# Patient Record
Sex: FEMALE | Race: WHITE | Employment: FULL TIME | URBAN - NONMETROPOLITAN AREA
[De-identification: names, ages, dates, MRNs, and addresses within clinical notes are randomized per-mention and may not be internally consistent; named-entity substitution may affect disease eponyms.]

---

## 2022-10-05 ENCOUNTER — HOSPITAL ENCOUNTER (EMERGENCY)
Age: 32
Discharge: HOME | End: 2022-10-05
Payer: MEDICAID

## 2022-10-05 VITALS
DIASTOLIC BLOOD PRESSURE: 88 MMHG | WEIGHT: 184 LBS | RESPIRATION RATE: 16 BRPM | HEART RATE: 82 BPM | TEMPERATURE: 98.4 F | SYSTOLIC BLOOD PRESSURE: 146 MMHG | OXYGEN SATURATION: 100 %

## 2022-10-05 DIAGNOSIS — R19.7 DIARRHEA, UNSPECIFIED TYPE: Primary | ICD-10-CM

## 2022-10-05 DIAGNOSIS — R11.0 NAUSEA: ICD-10-CM

## 2022-10-05 PROCEDURE — 99282 EMERGENCY DEPT VISIT SF MDM: CPT

## 2022-10-05 RX ORDER — ONDANSETRON 4 MG/1
4 TABLET, ORALLY DISINTEGRATING ORAL ONCE
Status: DISCONTINUED | OUTPATIENT
Start: 2022-10-05 | End: 2022-10-05

## 2022-10-05 ASSESSMENT — ENCOUNTER SYMPTOMS
VOMITING: 0
DIARRHEA: 1
NAUSEA: 1

## 2022-10-05 NOTE — ED PROVIDER NOTES
[facility] EMERGENCY DEPT  eMERGENCY dEPARTMENT eNCOUnter      Pt Name: Pam Garza  MRN: [id number]  [place] 1990  Date of evaluation: 10/5/2022  Provider: Afua Brady, [address]       Chief Complaint   Patient presents with    Nausea         HISTORY OF PRESENT ILLNESS   (Location/Symptom, Timing/Onset,Context/Setting, Quality, Duration, Modifying Factors, Severity)  Note limiting factors. Pam Garza is a 28 y.o. female who presents to the emergency department from work after an episode of diarrhea. She also had a rash to her chest and nausea. This has all resolved after her long wait. (4hrs) Denies pregnancy. Says she doesn't always eat right. Thinks it may be something she ate    The history is provided by the patient. NursingNotes were reviewed. REVIEW OF SYSTEMS    (2-9 systems for level 4, 10 or more for level 5)     Review of Systems   Gastrointestinal:  Positive for diarrhea and nausea. Negative for vomiting. Genitourinary:  Negative for dysuria. Skin:  Positive for rash. Except as noted above the remainder of the review of systems was reviewed and negative. PAST MEDICAL HISTORY   No past medical history on file. SURGICALHISTORY     No past surgical history on file. CURRENT MEDICATIONS       There are no discharge medications for this patient. ALLERGIES     Asa [aspirin], Pcn [penicillins], Peanut butter flavor, and Shrimp (diagnostic)    FAMILY HISTORY     No family history on file.        SOCIAL HISTORY       Social History     Socioeconomic History    Marital status: Single       SCREENINGS    Andry Coma Scale  Eye Opening: Spontaneous  Best Verbal Response: Oriented  Best Motor Response: Obeys commands  Hawkins Coma Scale Score: 15 @FLOW([id number])@      PHYSICAL EXAM    (up to 7 for level 4, 8 or more for level 5)     ED Triage Vitals [10/05/22 1529]   BP Temp Temp src Heart Rate Resp SpO2 Height Weight   (!) 152/90 98.4 °F (36.9 °C) -- 82 17 100 % -- 184 lb (83.5 kg)       Physical Exam  Vitals and nursing note reviewed. Constitutional:       Appearance: Normal appearance. She is well-developed. She is obese. HENT:      Head: Normocephalic and atraumatic. Eyes:      General: No scleral icterus. Right eye: No discharge. Left eye: No discharge. Cardiovascular:      Rate and Rhythm: Normal rate and regular rhythm. Heart sounds: Normal heart sounds. Pulmonary:      Effort: Pulmonary effort is normal. No respiratory distress. Breath sounds: Normal breath sounds. Abdominal:      General: Abdomen is protuberant. Bowel sounds are normal.      Palpations: Abdomen is soft. Tenderness: There is no abdominal tenderness. Musculoskeletal:      Cervical back: Normal range of motion and neck supple. Skin:     Findings: No rash. Neurological:      Mental Status: She is alert and oriented to person, place, and time. Psychiatric:         Behavior: Behavior normal.       DIAGNOSTIC RESULTS     EKG: All EKG's are interpreted by the Emergency Department Physician who either signs or Co-signsthis chart in the absence of a cardiologist.        RADIOLOGY:   Non-plain filmimages such as CT, Ultrasound and MRI are read by the radiologist. Plain radiographic images are visualized and preliminarily interpreted by the emergency physician with the below findings:      Interpretation per the Radiologist below, if available at the time of this note:    No orders to display         ED BEDSIDEULTRASOUND:   Performed by ED Physician -none    LABS:  Labs Reviewed - No data to display      All other labs were within normal range or not returned as of this dictation.     EMERGENCY DEPARTMENT COURSE and DIFFERENTIALDIAGNOSIS/MDM:   Vitals:    Vitals:    10/05/22 1529 10/05/22 1929   BP: (!) 152/90 (!) 146/88   Pulse: 82    Resp: 17 16   Temp: 98.4 °F (36.9 °C)    SpO2: 100% 100%   Weight: 184 lb (83.5 kg)            MDM  Counseled to return to work tomorrow if she is not running a fever or having any diarrhea or vomiting. Patient has no concerns for COVID. She has had it previously. Has not had any more diarrhea and nausea is gone      CONSULTS:  None    PROCEDURES:  Unless otherwise noted below, none     Procedures    FINAL IMPRESSION      1. Diarrhea, unspecified type    2. Nausea        DISPOSITION/PLAN   DISPOSITION Decision To Discharge 10/05/2022 07:08:24 PM      PATIENT REFERRED TO:  No follow-up provider specified. DISCHARGE MEDICATIONS:  There are no discharge medications for this patient.          (Please note that portions of this note were completed with a voice recognitionprogram.  Efforts were made to edit the dictations but occasionally words are mis-transcribed.)    JOAQUÍN Laboy (electronically signed)          JOAQUÍN Laboy  10/06/22 [id number]

## 2023-02-15 ENCOUNTER — APPOINTMENT (OUTPATIENT)
Dept: CT IMAGING | Age: 33
End: 2023-02-15
Payer: MEDICAID

## 2023-02-15 ENCOUNTER — HOSPITAL ENCOUNTER (EMERGENCY)
Age: 33
Discharge: HOME | End: 2023-02-15
Payer: MEDICAID

## 2023-02-15 VITALS
WEIGHT: 180 LBS | HEIGHT: 64 IN | DIASTOLIC BLOOD PRESSURE: 84 MMHG | OXYGEN SATURATION: 99 % | TEMPERATURE: 98.4 F | RESPIRATION RATE: 16 BRPM | BODY MASS INDEX: 30.73 KG/M2 | SYSTOLIC BLOOD PRESSURE: 148 MMHG | HEART RATE: 84 BPM

## 2023-02-15 DIAGNOSIS — N83.202 CYST OF LEFT OVARY: ICD-10-CM

## 2023-02-15 DIAGNOSIS — R10.13 ABDOMINAL PAIN, EPIGASTRIC: Primary | ICD-10-CM

## 2023-02-15 LAB
ALBUMIN SERPL-MCNC: 5.1 G/DL (ref 3.5–5.2)
ALP BLD-CCNC: 71 U/L (ref 35–104)
ALT SERPL-CCNC: 20 U/L (ref 5–33)
ANION GAP SERPL CALCULATED.3IONS-SCNC: 11 MMOL/L (ref 7–19)
APTT: 29.5 SEC (ref 26–36.2)
AST SERPL-CCNC: 21 U/L (ref 5–32)
BACTERIA: ABNORMAL /HPF
BASOPHILS ABSOLUTE: 0.1 K/UL (ref 0–0.2)
BASOPHILS RELATIVE PERCENT: 0.6 % (ref 0–1)
BILIRUB SERPL-MCNC: 0.3 MG/DL (ref 0.2–1.2)
BILIRUBIN URINE: NEGATIVE
BLOOD, URINE: NEGATIVE
BUN BLDV-MCNC: 14 MG/DL (ref 6–20)
CALCIUM SERPL-MCNC: 9.4 MG/DL (ref 8.6–10)
CHLORIDE BLD-SCNC: 102 MMOL/L (ref 98–111)
CLARITY: CLEAR
CO2: 26 MMOL/L (ref 22–29)
COLOR: YELLOW
CREAT SERPL-MCNC: 0.7 MG/DL (ref 0.5–0.9)
CRYSTALS, UA: ABNORMAL /HPF
EOSINOPHILS ABSOLUTE: 0.2 K/UL (ref 0–0.6)
EOSINOPHILS RELATIVE PERCENT: 1.8 % (ref 0–5)
EPITHELIAL CELLS, UA: 4 /HPF (ref 0–5)
GFR SERPL CREATININE-BSD FRML MDRD: >60 ML/MIN/{1.73_M2}
GLUCOSE BLD-MCNC: 82 MG/DL (ref 74–109)
GLUCOSE URINE: NEGATIVE MG/DL
HCG QUALITATIVE: NEGATIVE
HCT VFR BLD CALC: 39.8 % (ref 37–47)
HEMOGLOBIN: 13.5 G/DL (ref 12–16)
HYALINE CASTS: 2 /HPF (ref 0–8)
IMMATURE GRANULOCYTES #: 0 K/UL
INR BLD: 0.94 (ref 0.88–1.18)
KETONES, URINE: NEGATIVE MG/DL
LEUKOCYTE ESTERASE, URINE: ABNORMAL
LIPASE: 26 U/L (ref 13–60)
LYMPHOCYTES ABSOLUTE: 2.1 K/UL (ref 1.1–4.5)
LYMPHOCYTES RELATIVE PERCENT: 23.4 % (ref 20–40)
MCH RBC QN AUTO: 30 PG (ref 27–31)
MCHC RBC AUTO-ENTMCNC: 33.9 G/DL (ref 33–37)
MCV RBC AUTO: 88.4 FL (ref 81–99)
MONOCYTES ABSOLUTE: 0.6 K/UL (ref 0–0.9)
MONOCYTES RELATIVE PERCENT: 6.2 % (ref 0–10)
NEUTROPHILS ABSOLUTE: 6.1 K/UL (ref 1.5–7.5)
NEUTROPHILS RELATIVE PERCENT: 67.6 % (ref 50–65)
NITRITE, URINE: NEGATIVE
PDW BLD-RTO: 12.1 % (ref 11.5–14.5)
PH UA: 7 (ref 5–8)
PLATELET # BLD: 236 K/UL (ref 130–400)
PMV BLD AUTO: 10.3 FL (ref 9.4–12.3)
POTASSIUM REFLEX MAGNESIUM: 4 MMOL/L (ref 3.5–5)
PROTEIN UA: ABNORMAL MG/DL
PROTHROMBIN TIME: 12.5 SEC (ref 12–14.6)
RBC # BLD: 4.5 M/UL (ref 4.2–5.4)
RBC UA: 2 /HPF (ref 0–4)
SODIUM BLD-SCNC: 139 MMOL/L (ref 136–145)
SPECIFIC GRAVITY UA: 1.02 (ref 1–1.03)
TOTAL PROTEIN: 7.5 G/DL (ref 6.6–8.7)
UROBILINOGEN, URINE: 0.2 E.U./DL
WBC # BLD: 9 K/UL (ref 4.8–10.8)
WBC UA: 5 /HPF (ref 0–5)

## 2023-02-15 PROCEDURE — 85730 THROMBOPLASTIN TIME PARTIAL: CPT

## 2023-02-15 PROCEDURE — 85610 PROTHROMBIN TIME: CPT

## 2023-02-15 PROCEDURE — 6360000002 HC RX W HCPCS: Performed by: PHYSICIAN ASSISTANT

## 2023-02-15 PROCEDURE — 84703 CHORIONIC GONADOTROPIN ASSAY: CPT

## 2023-02-15 PROCEDURE — 83690 ASSAY OF LIPASE: CPT

## 2023-02-15 PROCEDURE — 36415 COLL VENOUS BLD VENIPUNCTURE: CPT

## 2023-02-15 PROCEDURE — 99285 EMERGENCY DEPT VISIT HI MDM: CPT

## 2023-02-15 PROCEDURE — 6360000004 HC RX CONTRAST MEDICATION: Performed by: PHYSICIAN ASSISTANT

## 2023-02-15 PROCEDURE — 85025 COMPLETE CBC W/AUTO DIFF WBC: CPT

## 2023-02-15 PROCEDURE — 81001 URINALYSIS AUTO W/SCOPE: CPT

## 2023-02-15 PROCEDURE — 96374 THER/PROPH/DIAG INJ IV PUSH: CPT

## 2023-02-15 PROCEDURE — 74177 CT ABD & PELVIS W/CONTRAST: CPT

## 2023-02-15 PROCEDURE — 80053 COMPREHEN METABOLIC PANEL: CPT

## 2023-02-15 RX ORDER — LOSARTAN POTASSIUM 50 MG/1
50 TABLET ORAL DAILY
COMMUNITY

## 2023-02-15 RX ORDER — OXYMETAZOLINE HYDROCHLORIDE 0.05 G/100ML
2 SPRAY NASAL ONCE
Status: DISCONTINUED | OUTPATIENT
Start: 2023-02-15 | End: 2023-02-15 | Stop reason: HOSPADM

## 2023-02-15 RX ORDER — METOCLOPRAMIDE HYDROCHLORIDE 5 MG/ML
10 INJECTION INTRAMUSCULAR; INTRAVENOUS ONCE
Status: COMPLETED | OUTPATIENT
Start: 2023-02-15 | End: 2023-02-15

## 2023-02-15 RX ORDER — HYDROCHLOROTHIAZIDE 12.5 MG/1
12.5 TABLET ORAL DAILY
COMMUNITY

## 2023-02-15 RX ADMIN — METOCLOPRAMIDE 10 MG: 5 INJECTION, SOLUTION INTRAMUSCULAR; INTRAVENOUS at 17:53

## 2023-02-15 RX ADMIN — IOPAMIDOL 70 ML: 755 INJECTION, SOLUTION INTRAVENOUS at 17:18

## 2023-02-15 ASSESSMENT — ENCOUNTER SYMPTOMS
ABDOMINAL PAIN: 1
APNEA: 0
EYE PAIN: 0
EYE DISCHARGE: 0
COUGH: 0
DIARRHEA: 1
SHORTNESS OF BREATH: 0
PHOTOPHOBIA: 0
COLOR CHANGE: 0
RHINORRHEA: 0
SORE THROAT: 0
ABDOMINAL DISTENTION: 0
NAUSEA: 0
BACK PAIN: 0

## 2023-02-15 ASSESSMENT — PAIN SCALES - GENERAL: PAINLEVEL_OUTOF10: 7

## 2023-02-15 ASSESSMENT — PAIN DESCRIPTION - DESCRIPTORS: DESCRIPTORS: STABBING

## 2023-02-15 ASSESSMENT — PAIN - FUNCTIONAL ASSESSMENT: PAIN_FUNCTIONAL_ASSESSMENT: 0-10

## 2023-02-15 ASSESSMENT — PAIN DESCRIPTION - LOCATION: LOCATION: ABDOMEN

## 2023-02-15 NOTE — LETTER
[facility] EMERGENCY DEPT  [address] [address]  Phone: [phone]               February 15, 2023    Patient: Michael Carbajal   YOB: 1990   Date of Visit: 2/15/2023       To Whom It May Concern:    Michael Carbajal was seen and treated in our emergency department on 2/15/2023. She may return on 2/16/2023.     Sincerely,       Candie Abbott RN         Signature:__________________________________

## 2023-02-16 NOTE — ED PROVIDER NOTES
[facility] EMERGENCY DEPT  eMERGENCYdEPARTMENT eNCOUnter      Pt Name: Ajay Robrets  MRN: [id number]  [place] 1990  Date of evaluation: 2/15/2023  Provider:RICHA Demarco    CHIEF COMPLAINT       Chief Complaint   Patient presents with    Epistaxis     Pt arrived to the ed with c/o nose bleed. Onset 1250. Pt also c/o abdominal pain    Abdominal Pain         HISTORY OF PRESENT ILLNESS  (Location/Symptom, Timing/Onset, Context/Setting, Quality, Duration, Modifying Factors, Severity.)   Ajay Roberts is a 28 y.o. female who presents to the emergency department with complaints of nose bleed. Onset 1250 today no active bleeding here now. States epigastric pain with loose stool. The patient has no prior surgical hx. She denies fever or chills. No recent abx. She endorses eating out twice yesterday could play a role in possible GI issues. No flank referral. She is prone to sinusitis and getting dried out could be playing a role. HPI    Nursing Notes were reviewed and I agree. REVIEW OF SYSTEMS    (2-9 systems for level 4, 10 or more for level 5)     Review of Systems   Constitutional:  Negative for activity change, appetite change, chills and fever. HENT:  Positive for nosebleeds. Negative for congestion, postnasal drip, rhinorrhea and sore throat. Eyes:  Negative for photophobia, pain, discharge and visual disturbance. Respiratory:  Negative for apnea, cough and shortness of breath. Cardiovascular:  Negative for chest pain and leg swelling. Gastrointestinal:  Positive for abdominal pain and diarrhea. Negative for abdominal distention and nausea. Genitourinary:  Negative for vaginal bleeding. Musculoskeletal:  Negative for arthralgias, back pain, joint swelling, neck pain and neck stiffness. Skin:  Negative for color change and rash. Neurological:  Negative for dizziness, syncope, facial asymmetry and headaches. Hematological:  Negative for adenopathy. Does not bruise/bleed easily. Psychiatric/Behavioral:  Negative for agitation, behavioral problems and confusion. Except as noted above the remainder of the review of systems was reviewed and negative. PAST MEDICAL HISTORY     Past Medical History:   Diagnosis Date    Hypertension          SURGICAL HISTORY       Past Surgical History:   Procedure Laterality Date    ENDOMETRIAL ABLATION           CURRENT MEDICATIONS       Discharge Medication List as of 2/15/2023  5:48 PM        CONTINUE these medications which have NOT CHANGED    Details   losartan (COZAAR) 50 MG tablet Take 50 mg by mouth dailyHistorical Med      hydroCHLOROthiazide (HYDRODIURIL) 12.5 MG tablet Take 12.5 mg by mouth dailyHistorical Med             ALLERGIES     Asa [aspirin], Pcn [penicillins], Peanut butter flavor, and Shrimp (diagnostic)    FAMILY HISTORY     History reviewed. No pertinent family history. SOCIAL HISTORY       Social History     Socioeconomic History    Marital status: Single     Spouse name: None    Number of children: None    Years of education: None    Highest education level: None   Tobacco Use    Smoking status: Never    Smokeless tobacco: Never   Vaping Use    Vaping Use: Never used   Substance and Sexual Activity    Alcohol use: Not Currently    Drug use: Not Currently       SCREENINGS    Andry Coma Scale  Eye Opening: Spontaneous  Best Verbal Response: Oriented  Best Motor Response: Obeys commands  Andry Coma Scale Score: 15      PHYSICAL EXAM    (up to 7 forlevel 4, 8 or more for level 5)     ED Triage Vitals   BP Temp Temp Source Heart Rate Resp SpO2 Height Weight   02/15/23 1311 02/15/23 1311 02/15/23 1311 02/15/23 1311 02/15/23 1311 02/15/23 1311 02/15/23 1307 02/15/23 1307   (!) 151/98 98.2 °F (36.8 °C) Oral 86 17 99 % 5' 4\" (1.626 m) 180 lb (81.6 kg)       Physical Exam  Vitals and nursing note reviewed. Constitutional:       General: She is not in acute distress. Appearance: She is well-developed.  She is not diaphoretic. HENT:      Head: Normocephalic and atraumatic. Right Ear: External ear normal.      Left Ear: External ear normal.      Mouth/Throat:      Mouth: Mucous membranes are moist.      Pharynx: Oropharynx is clear. No pharyngeal swelling or oropharyngeal exudate. Eyes:      General:         Right eye: No discharge. Left eye: No discharge. Extraocular Movements: Extraocular movements intact. Pupils: Pupils are equal, round, and reactive to light. Neck:      Thyroid: No thyromegaly. Cardiovascular:      Rate and Rhythm: Normal rate and regular rhythm. Heart sounds: Normal heart sounds. No murmur heard. No friction rub. Pulmonary:      Effort: Pulmonary effort is normal. No respiratory distress. Breath sounds: Normal breath sounds. No stridor. No wheezing. Abdominal:      General: Bowel sounds are normal. There is no distension. Palpations: Abdomen is soft. Tenderness: There is abdominal tenderness in the epigastric area. Musculoskeletal:         General: Normal range of motion. Cervical back: Normal range of motion and neck supple. Skin:     General: Skin is warm and dry. Capillary Refill: Capillary refill takes less than 2 seconds. Findings: No rash. Neurological:      Mental Status: She is alert and oriented to person, place, and time. Cranial Nerves: No cranial nerve deficit. Sensory: No sensory deficit. Coordination: Coordination normal.   Psychiatric:         Behavior: Behavior normal.         Thought Content:  Thought content normal.         DIAGNOSTIC RESULTS     RADIOLOGY:   Non-plain film images such as CT, Ultrasound and MRI are read by the radiologist. Plain radiographic images are visualized and preliminarilyinterpreted by No att. providers found with the below findings:        Interpretation per the Radiologist below, if available at the time of this note:    CT ABDOMEN PELVIS W IV CONTRAST Additional Contrast? None   Final Result   Impression:    1.No acute abnormality identified within the abdomen or pelvis.    2.Probable 2 cm LEFT ovarian corpus luteum.    3.Additional findings as detailed above.          LABS:  Labs Reviewed   URINALYSIS WITH REFLEX TO CULTURE - Abnormal; Notable for the following components:       Result Value    Protein, UA TRACE (*)     Leukocyte Esterase, Urine TRACE (*)     All other components within normal limits   CBC WITH AUTO DIFFERENTIAL - Abnormal; Notable for the following components:    Neutrophils % 67.6 (*)     All other components within normal limits   MICROSCOPIC URINALYSIS - Abnormal; Notable for the following components:    Bacteria, UA TRACE (*)     Crystals, UA NEG (*)     All other components within normal limits   HCG, SERUM, QUALITATIVE   LIPASE   COMPREHENSIVE METABOLIC PANEL W/ REFLEX TO MG FOR LOW K   PROTIME-INR   APTT       All other labs were within normal range or notreturned as of this dictation.    RE-ASSESSMENT          EMERGENCY DEPARTMENT COURSE and DIFFERENTIAL DIAGNOSIS/MDM:   Vitals:    Vitals:    02/15/23 1307 02/15/23 1311 02/15/23 1800   BP:  (!) 151/98 (!) 148/84   Pulse:  86 84   Resp:  17 16   Temp:  98.2 °F (36.8 °C) 98.4 °F (36.9 °C)   TempSrc:  Oral    SpO2:  99% 99%   Weight: 180 lb (81.6 kg)     Height: 5' 4\" (1.626 m)           MDM  Number of Diagnoses or Management Options  Abdominal pain, epigastric: new, needed workup  Cyst of left ovary: new, needed workup     Amount and/or Complexity of Data Reviewed  Clinical lab tests: reviewed  Tests in the radiology section of CPT®: reviewed  Tests in the medicine section of CPT®: reviewed  Discuss the patient with other providers: yes    Pain could be referred for gallbladder she will follow up on her HIDA scan with PCP. Made aware of ovarian cyst she will follow with OB she denies any active bleeding has been observed multiple hours an ambulated still hasnt provided stool. Plan for  ART    PROCEDURES:    Procedures      FINAL IMPRESSION      1. Abdominal pain, epigastric    2. Cyst of left ovary          DISPOSITION/PLAN   DISPOSITION Decision To Discharge 02/15/2023 05:44:52 PM      PATIENT REFERRED TO:  [facility] EMERGENCY DEPT  Sundar Milton  [phone]    If symptoms worsen    Tram Damon  [phone]    outpatient hida scan      DISCHARGE MEDICATIONS:  Discharge Medication List as of 2/15/2023  5:48 PM          (Please note that portions of this note were completed with a voice recognition program.  Efforts were made to edit the dictations but occasionallywords are mis-transcribed.)    Joaquina Helms, [address] [place] [address], [address] [place] [address]  02/15/23 0037

## 2023-03-27 ENCOUNTER — HOSPITAL ENCOUNTER (OUTPATIENT)
Dept: GENERAL RADIOLOGY | Age: 33
Discharge: HOME | End: 2023-03-27
Payer: COMMERCIAL

## 2023-03-27 DIAGNOSIS — S39.012D ACUTE MYOFASCIAL STRAIN OF LUMBAR REGION, SUBSEQUENT ENCOUNTER: ICD-10-CM

## 2023-03-27 DIAGNOSIS — M54.50 LOW BACK PAIN, UNSPECIFIED BACK PAIN LATERALITY, UNSPECIFIED CHRONICITY, UNSPECIFIED WHETHER SCIATICA PRESENT: ICD-10-CM

## 2023-03-27 PROCEDURE — 72100 X-RAY EXAM L-S SPINE 2/3 VWS: CPT

## 2025-04-28 ENCOUNTER — HOSPITAL ENCOUNTER (OUTPATIENT)
Dept: PHYSICAL THERAPY | Age: 35
Setting detail: THERAPIES SERIES
Discharge: HOME | End: 2025-04-28
Payer: COMMERCIAL

## 2025-04-28 VITALS — DIASTOLIC BLOOD PRESSURE: 109 MMHG | SYSTOLIC BLOOD PRESSURE: 153 MMHG | HEART RATE: 84 BPM | OXYGEN SATURATION: 100 %

## 2025-04-28 PROCEDURE — 97162 PT EVAL MOD COMPLEX 30 MIN: CPT

## 2025-04-28 PROCEDURE — 97110 THERAPEUTIC EXERCISES: CPT

## 2025-04-28 ASSESSMENT — PAIN DESCRIPTION - PAIN TYPE: TYPE: CHRONIC PAIN

## 2025-04-28 ASSESSMENT — PAIN DESCRIPTION - LOCATION: LOCATION: BACK

## 2025-04-28 ASSESSMENT — PAIN DESCRIPTION - ORIENTATION: ORIENTATION: POSTERIOR

## 2025-04-28 ASSESSMENT — PAIN DESCRIPTION - DESCRIPTORS: DESCRIPTORS: ACHING;SORE

## 2025-04-28 ASSESSMENT — PAIN SCALES - GENERAL: PAINLEVEL_OUTOF10: 5

## 2025-04-28 NOTE — PROGRESS NOTES
Physical Therapy: Initial Evaluation    Patient: Naima Madison (34 y.o. female)   Examination Date: 2025  Plan of Care Certification Period: 2025 to 25      :  1990 ;    Confirmed: Yes MRN: [id number]  CSN: [id number]   Insurance: Payor: BCBS / Plan: BCBS OUT OF STATE / Product Type: *No Product type* /   Insurance ID: [id number] - ([facility]) Secondary Insurance (if applicable):    Referring Physician: Celina Mendez APRN Jessica Awbery   PCP: Cristy Kruse APRN - NP Visits to Date/Visits Approved:     No Show/Cancelled Appts:   /       Medical Diagnosis: Low back pain, unspecified [M54.50] Low back pain  Treatment Diagnosis: Low back pain     PERTINENT MEDICAL HISTORY      Self reported health status:: Fair    Medical History:     Past Medical History:   Diagnosis Date    Hypertension      Surgical History:   Past Surgical History:   Procedure Laterality Date    ENDOMETRIAL ABLATION         Medications:   Current Outpatient Medications:     losartan (COZAAR) 50 MG tablet, Take 50 mg by mouth daily, Disp: , Rfl:     hydroCHLOROthiazide (HYDRODIURIL) 12.5 MG tablet, Take 12.5 mg by mouth daily, Disp: , Rfl:   Allergies: Asa [aspirin], Pcn [penicillins], Peanut butter flavoring agent (non-screening), and Shrimp (diagnostic)      SUBJECTIVE EXAMINATION      ,           Subjective History:    Subjective: She rates pain at 5/10.  She has constant pain.  She has lower and mid back pain.  She has most pain with prolonged standing.  She denies LE numbness, tingling, or weakness.  She has trouble sleeping at times due to pain.  She is most comfortable lying down.  She performs light home chores.  Additional Pertinent Hx (if applicable): 34 year old female has been referred for PT evaluation and treatment of back pain.  She has had back pain since 2016.  She was treated with \"some\" therapy.  She had residual back pain.  While working at [employer] 2 years ago she experienced increased

## 2025-05-02 ENCOUNTER — HOSPITAL ENCOUNTER (OUTPATIENT)
Dept: PHYSICAL THERAPY | Age: 35
Setting detail: THERAPIES SERIES
Discharge: HOME | End: 2025-05-02
Payer: COMMERCIAL

## 2025-05-02 PROCEDURE — G0283 ELEC STIM OTHER THAN WOUND: HCPCS

## 2025-05-02 PROCEDURE — 97110 THERAPEUTIC EXERCISES: CPT

## 2025-05-02 NOTE — PROGRESS NOTES
Physical Therapy: Daily Note   Patient: Naima Madison (34 y.o. female)   Examination Date: 2025  Plan of Care/Certification Expiration Date: 25    No data recorded   :  1990 # of Visits since SOC:   2   MRN: [id number]  CSN: [id number] Start of Care Date:   2025   Insurance: Payor: [facility] / Plan: BCBS OUT OF STATE / Product Type: *No Product type* /   Insurance ID: [id number] - ([facility]) Secondary Insurance (if applicable):    Referring Physician: Celina Mendez APRN Jessica Awbery   PCP: Cristy Kruse APRN - NP Visits to Date/Visits Approved:     No Show/Cancelled Appts:   /       Medical Diagnosis: Low back pain, unspecified [M54.50]    Treatment Diagnosis: Low back pain        SUBJECTIVE EXAMINATION   Pain Level: Pain Screening  Patient Currently in Pain: Denies    Patient Comments: Subjective: I am about the same.        TREATMENT     Exercises:      Treatment Reasoning    Exercise 1: 6 ' with 3 brief seated rest breaks  Exercise 2: Supine, TA series alone 5 x 10 sec, all others 10 reps- moderate cues for all  Exercise 3: Supine, lower trunk rotation, 10 reps  Exercise 4: Supine, bridges, 10 reps  Exercise 5: Supine, check leg length, treat accordingly,-leg length even 2025  Exercise 6: Supine, 1 MARSHALL x 10 reps-with towel, DKTC X 5 with towel  Exercise 7: Supine, pelvic tilt, 10 reps  Exercise 8: Supine, partial crunch, 10 reps  Exercise 9: Sitting, LAQ, 3#, 10 reps  Exercise 10: Sitting, sit to stand, mat to chair progression, 10 reps  Exercise 11: Standing, multifidus row, Paloff press, shoulder retraction, 10 reps-NOT TODAY  Exercise 12: Standing, hip abd, ext, mini squat, heel raise 1 x 10  Exercise 13: Standing, press down on top of physio ball for abd contraction, 10 reps-NOT TODAY  Exercise 14: LBE, 5 min-NOT TODAY  Exercise 15: IFC wth MH as needed X 20 mins-seated  Exercise 16: ILT, 1/2 body wt, clinic protocols, as indicated.

## 2025-05-05 ENCOUNTER — HOSPITAL ENCOUNTER (OUTPATIENT)
Dept: PHYSICAL THERAPY | Age: 35
Setting detail: THERAPIES SERIES
Discharge: HOME | End: 2025-05-05
Payer: COMMERCIAL

## 2025-05-05 PROCEDURE — G0283 ELEC STIM OTHER THAN WOUND: HCPCS

## 2025-05-05 PROCEDURE — 97110 THERAPEUTIC EXERCISES: CPT

## 2025-05-05 ASSESSMENT — PAIN SCALES - GENERAL: PAINLEVEL_OUTOF10: 3

## 2025-05-05 ASSESSMENT — PAIN DESCRIPTION - ORIENTATION: ORIENTATION: LOWER

## 2025-05-05 ASSESSMENT — PAIN DESCRIPTION - LOCATION: LOCATION: BACK

## 2025-05-05 ASSESSMENT — PAIN DESCRIPTION - DESCRIPTORS: DESCRIPTORS: ACHING;SORE

## 2025-05-05 ASSESSMENT — PAIN DESCRIPTION - PAIN TYPE: TYPE: CHRONIC PAIN

## 2025-05-05 NOTE — PROGRESS NOTES
Physical Therapy: Daily Note   Patient: Naima Madison (34 y.o. female)   Examination Date: 2025  Plan of Care/Certification Expiration Date: 25    No data recorded   :  1990 # of Visits since SOC:   3   MRN: [id number]  CSN: [id number] Start of Care Date:   2025   Insurance: Payor: BC / Plan: BCBS OUT OF STATE / Product Type: *No Product type* /   Insurance ID: [id number] - ([facility]) Secondary Insurance (if applicable):    Referring Physician: Celina Mendez APRN Jessica Awbery   PCP: Cristy Kruse APRN - NP Visits to Date/Visits Approved: 3 / 12    No Show/Cancelled Appts:   /       Medical Diagnosis: Low back pain, unspecified [M54.50]    Treatment Diagnosis: Low back pain        SUBJECTIVE EXAMINATION   Pain Level: Pain Screening  Patient Currently in Pain: Yes  Pain Assessment: 0-10  Pain Level: 3  Pain Type: Chronic pain  Pain Location: Back  Pain Orientation: Lower  Pain Descriptors: Aching, Sore    Patient Comments: Subjective: Doing okay. I was fine after last session        TREATMENT     Exercises:      Treatment Reasoning    Exercise 1: 6 ' with 3 brief seated rest breaks  Exercise 2: Supine, TA series alone 10 x 10 sec, all others 10 reps- minimal cues  Exercise 3: Supine, lower trunk rotation, 10 reps  Exercise 4: Supine, bridges, 10 reps  Exercise 5: Supine, check leg length, treat accordingly,-leg length even 2025  Exercise 6: Supine, 1 MARSHALL x 10 reps-with towel, DKTC X 10 with towel  Exercise 7: Supine, pelvic tilt, 10 reps  Exercise 8: Supine, partial crunch, 10 reps  Exercise 9: Sitting, LAQ, 3#, 10 reps  Exercise 10: Sitting, sit to stand, mat to chair progression, 10 reps-from mat  Exercise 11: Standing, multifidus row-not today, Paloff press-not today, shoulder retraction-red x 10 reps  Exercise 12: Standing, hip abd, ext, mini squat, heel raise 1 x 10  Exercise 13: Standing, press down on top of physio ball for abd contraction, 10 reps  Exercise 14: LBE, 5

## 2025-05-08 ENCOUNTER — APPOINTMENT (OUTPATIENT)
Dept: PHYSICAL THERAPY | Age: 35
End: 2025-05-08
Payer: COMMERCIAL

## 2025-05-09 ENCOUNTER — HOSPITAL ENCOUNTER (OUTPATIENT)
Dept: PHYSICAL THERAPY | Age: 35
Setting detail: THERAPIES SERIES
Discharge: HOME | End: 2025-05-09
Payer: COMMERCIAL

## 2025-05-09 PROCEDURE — 97032 APPL MODALITY 1+ESTIM EA 15: CPT

## 2025-05-09 PROCEDURE — 97110 THERAPEUTIC EXERCISES: CPT

## 2025-05-09 ASSESSMENT — PAIN DESCRIPTION - LOCATION: LOCATION: BACK

## 2025-05-09 ASSESSMENT — PAIN DESCRIPTION - ORIENTATION: ORIENTATION: LOWER

## 2025-05-09 ASSESSMENT — PAIN SCALES - GENERAL: PAINLEVEL_OUTOF10: 4

## 2025-05-09 ASSESSMENT — PAIN DESCRIPTION - DESCRIPTORS: DESCRIPTORS: ACHING

## 2025-05-09 ASSESSMENT — PAIN DESCRIPTION - PAIN TYPE: TYPE: CHRONIC PAIN

## 2025-05-09 NOTE — PROGRESS NOTES
Physical Therapy: Daily Note   Patient: Naima Madison (34 y.o. female)   Examination Date: 2025  Plan of Care/Certification Expiration Date: 25    No data recorded   :  1990 # of Visits since SOC:   4   MRN: [id number]  CSN: [id number] Start of Care Date:   2025   Insurance: Payor: [facility] / Plan: BCBS OUT OF STATE / Product Type: *No Product type* /   Insurance ID: [id number] - ([facility]) Secondary Insurance (if applicable):    Referring Physician: Celina Mendez APRN Jessica Awbery   PCP: Cristy Kruse APRN - NP Visits to Date/Visits Approved:     No Show/Cancelled Appts:   /       Medical Diagnosis: Low back pain, unspecified [M54.50]    Treatment Diagnosis: Low back pain        SUBJECTIVE EXAMINATION   Pain Level: Pain Screening  Patient Currently in Pain: Yes  Pain Assessment: 0-10  Pain Level: 4  Pain Type: Chronic pain  Pain Location: Back  Pain Orientation: Lower  Pain Descriptors: Aching    Patient Comments: Subjective: I had an episode where I got a severe headache and starting drifting off the road.  I went straight home and rested.  I feel better today.      TREATMENT     Exercises:      Treatment Reasoning    Exercise 1: 6 ' with 3 brief seated rest breaks   -NOT TODAY      CHECK VITALS PRE AND POST SESSION ..  BP  153/109      99 spo2, 81 HR  Exercise 2: Supine, TA series alone 5  x 10 sec, all others 10 reps- minimal cues  Exercise 3: Supine, lower trunk rotation, 10 reps  Exercise 4: Supine, bridges, 10 reps                     /107 , 99 SPO2, HR 84  Exercise 5: Supine, check leg length, treat accordingly,-leg length even 2025  Exercise 6: Supine, 1 MARSHALL x 10 reps-with towel, DKTC X 0  Exercise 7: Supine, pelvic tilt, 10 reps  Exercise 8: Supine, partial crunch, 10 reps-NOT TODAY  Exercise 9: Sitting, LAQ, 3#, 10 reps-NO WEIGHT BUT INCREASED TO 15 REPS ON   Exercise 10: Sitting, sit to stand, mat to chair progression, 10 reps-from mat  Exercise 11:

## 2025-05-12 ENCOUNTER — HOSPITAL ENCOUNTER (OUTPATIENT)
Dept: PHYSICAL THERAPY | Age: 35
Setting detail: THERAPIES SERIES
Discharge: HOME | End: 2025-05-12
Payer: COMMERCIAL

## 2025-05-12 PROCEDURE — G0283 ELEC STIM OTHER THAN WOUND: HCPCS

## 2025-05-12 PROCEDURE — 97110 THERAPEUTIC EXERCISES: CPT

## 2025-05-12 ASSESSMENT — PAIN DESCRIPTION - DESCRIPTORS: DESCRIPTORS: ACHING;SORE

## 2025-05-12 ASSESSMENT — PAIN SCALES - GENERAL: PAINLEVEL_OUTOF10: 5

## 2025-05-12 ASSESSMENT — PAIN DESCRIPTION - ORIENTATION: ORIENTATION: LOWER;LEFT

## 2025-05-12 ASSESSMENT — PAIN DESCRIPTION - PAIN TYPE: TYPE: CHRONIC PAIN

## 2025-05-12 ASSESSMENT — PAIN DESCRIPTION - LOCATION: LOCATION: BACK

## 2025-05-12 NOTE — PROGRESS NOTES
Physical Therapy  Daily Treatment Note  Date: 2025  Patient Name: Naima Madison  MRN: [id number]     :   1990    Subjective:      PT Visit Information  PT Insurance Information: BCBS    (no auth)  Total # of Visits Approved: 12  Total # of Visits to Date: 5  Plan of Care/Certification Expiration Date: 25  Progress Note Due Date: 25  Referring Provider (secondary): Celina Mendez  Subjective: Patient reports that her L low back and groin area has been bothering her pretty good today.    Pain Screening  Patient Currently in Pain: Yes  Pain Assessment: 0-10  Pain Level: 5  Pain Type: Chronic pain  Pain Location: Back  Pain Orientation: Lower;Left  Pain Descriptors: Aching;Sore       Treatment Activities:   Exercises  Exercise 1: 6 ' with 3 brief seated rest breaks-     CHECK VITALS PRE AND POST SESSION ..  BP  134/94      98 spo2, 91 HR  Exercise 2: Supine, TA series alone 5  x 10 sec, all others 10 reps- minimal cues  Exercise 3: Supine, lower trunk rotation, 10 reps  Exercise 4: Supine, bridges, 10 reps  Exercise 5: Supine, check leg length, treat accordingly,-leg length even 2025  Exercise 6: Supine, 1 MARSHALL x 10 reps-with towel, DKTC X 0  Exercise 7: Supine, pelvic tilt, 10 reps  Exercise 8: Supine, partial crunch, 10 reps-NOT TODAY  Exercise 9: Sitting, LAQ, 3#, 10 reps-NO WEIGHT BUT INCREASED TO 15 REPS ON   Exercise 10: Sitting, sit to stand, mat to chair progression, 10 reps-from mat  Exercise 11: Standing, multifidus row-not today, Paloff press-not today, shoulder retraction-red x 10 reps-NOT TODAY  Exercise 12: Standing, hip abd, ext, mini squat, heel raise 1 x 10  Exercise 13: Standing, press down on top of physio ball for abd contraction, 10 reps-NOT TODAY  Exercise 14: LBE, 5 min-NOT TODAY  Exercise 15: IFC wth MH as needed X 20 mins-seated                   /97, spo2 98%, HR 84  Exercise 16: ILT, 1/2 body wt, clinic protocols, as indicated.     Assessment:   Conditions

## 2025-05-15 ENCOUNTER — HOSPITAL ENCOUNTER (OUTPATIENT)
Dept: PHYSICAL THERAPY | Age: 35
Setting detail: THERAPIES SERIES
Discharge: HOME | End: 2025-05-15
Payer: COMMERCIAL

## 2025-05-15 PROCEDURE — G0283 ELEC STIM OTHER THAN WOUND: HCPCS

## 2025-05-15 PROCEDURE — 97110 THERAPEUTIC EXERCISES: CPT

## 2025-05-15 ASSESSMENT — PAIN DESCRIPTION - DESCRIPTORS: DESCRIPTORS: ACHING;SORE

## 2025-05-15 ASSESSMENT — PAIN SCALES - GENERAL: PAINLEVEL_OUTOF10: 4

## 2025-05-15 ASSESSMENT — PAIN DESCRIPTION - PAIN TYPE: TYPE: CHRONIC PAIN

## 2025-05-15 ASSESSMENT — PAIN DESCRIPTION - LOCATION: LOCATION: BACK

## 2025-05-15 ASSESSMENT — PAIN DESCRIPTION - ORIENTATION: ORIENTATION: RIGHT;LEFT;LOWER

## 2025-05-15 NOTE — PROGRESS NOTES
Physical Therapy  Daily Treatment Note  Date: 5/15/2025  Patient Name: Naima Madison  MRN: [id number]     :   1990    Subjective:      PT Visit Information  PT Insurance Information: BCBS    (no auth)  Total # of Visits Approved: 12  Total # of Visits to Date: 6  Plan of Care/Certification Expiration Date: 25  Progress Note Due Date: 25  Referring Provider (secondary): Celina Mendez  Subjective: Patient reports continued low back pain this date that is a little better than last times she was here.    Pain Screening  Patient Currently in Pain: Yes  Pain Assessment: 0-10  Pain Level: 4  Pain Type: Chronic pain  Pain Location: Back  Pain Orientation: Right;Left;Lower  Pain Descriptors: Aching;Sore       Treatment Activities:   Exercises  Exercise 1: 6 ' with 3 brief seated rest breaks-     CHECK VITALS PRE AND POST SESSION ..  BP  134/94      98 spo2, 91 HR  Exercise 2: Supine, TA series alone 5  x 10 sec, all others 10 reps- minimal cues  Exercise 3: Supine, lower trunk rotation, 10 reps  Exercise 4: Supine, bridges, 10 reps  Exercise 5: Supine, check leg length, treat accordingly,-leg length even 2025  Exercise 6: Supine, 1 MARSHALL x 10 reps-with towel, DKTC X 10  Exercise 7: Supine, pelvic tilt, 10 reps  Exercise 8: Supine, partial crunch, 10 reps  Exercise 9: Sitting, LAQ, 3#, 10 reps-NO WEIGHT BUT INCREASED TO 15 REPS ON   Exercise 10: Sitting, sit to stand, mat to chair progression, 10 reps-from mat     Assessment:   Conditions Requiring Skilled Therapeutic Intervention  Body Structures, Functions, Activity Limitations Requiring Skilled Therapeutic Intervention: Decreased functional mobility ;Decreased ADL status;Decreased ROM;Decreased balance;Decreased strength;Decreased high-level IADLs;Increased pain;Decreased posture  Assessment: Patient presents with decreased pain compared to previous session and is able to increase her distance with 6 min walk. She had no reports of increased pain

## 2025-05-19 ENCOUNTER — APPOINTMENT (OUTPATIENT)
Dept: PHYSICAL THERAPY | Age: 35
End: 2025-05-19
Payer: COMMERCIAL

## 2025-05-22 ENCOUNTER — APPOINTMENT (OUTPATIENT)
Dept: PHYSICAL THERAPY | Age: 35
End: 2025-05-22
Payer: COMMERCIAL

## 2025-05-27 ENCOUNTER — HOSPITAL ENCOUNTER (OUTPATIENT)
Dept: PHYSICAL THERAPY | Age: 35
Setting detail: THERAPIES SERIES
Discharge: HOME | End: 2025-05-27
Payer: COMMERCIAL

## 2025-05-27 ENCOUNTER — APPOINTMENT (OUTPATIENT)
Dept: PHYSICAL THERAPY | Age: 35
End: 2025-05-27
Payer: COMMERCIAL

## 2025-05-27 PROCEDURE — 97110 THERAPEUTIC EXERCISES: CPT

## 2025-05-27 PROCEDURE — G0283 ELEC STIM OTHER THAN WOUND: HCPCS

## 2025-05-27 ASSESSMENT — PAIN DESCRIPTION - LOCATION: LOCATION: BACK

## 2025-05-27 ASSESSMENT — PAIN SCALES - GENERAL: PAINLEVEL_OUTOF10: 3

## 2025-05-27 ASSESSMENT — PAIN DESCRIPTION - PAIN TYPE: TYPE: CHRONIC PAIN

## 2025-05-27 ASSESSMENT — PAIN DESCRIPTION - DESCRIPTORS: DESCRIPTORS: ACHING;SORE

## 2025-05-27 ASSESSMENT — PAIN DESCRIPTION - ORIENTATION: ORIENTATION: RIGHT;LEFT;LOWER

## 2025-05-27 NOTE — PROGRESS NOTES
Physical Therapy  Daily Treatment Note  Date: 2025  Patient Name: Naima Madison  MRN: [id number]     :   1990    Subjective:      PT Visit Information  PT Insurance Information: BCBS    (no auth)  Total # of Visits Approved: 12  Total # of Visits to Date: 7  Plan of Care/Certification Expiration Date: 25  Progress Note Due Date: 25  Referring Provider (secondary): Celina Mendez  Subjective: Patient reprots some pain this afternoon and states she can tell she hasnt been to therapy in a week.    Pain Screening  Patient Currently in Pain: Yes  Pain Assessment: 0-10  Pain Level: 3  Pain Type: Chronic pain  Pain Location: Back  Pain Orientation: Right;Left;Lower  Pain Descriptors: Aching;Sore       Treatment Activities:    Exercises  Exercise 1: 6 ' with 2 brief seated rest breaks-     CHECK VITALS PRE AND POST SESSION ..  BP  134/94      98 spo2, 91 HR---------- HEP IN CHART 25  Exercise 2: Supine, TA series alone 5  x 10 sec, all others 10 reps- minimal cues  Exercise 3: Supine, lower trunk rotation, 10 reps  Exercise 4: Supine, bridges, 10 reps  Exercise 5: Supine, check leg length, treat accordingly,-leg length even today  Exercise 6: Supine, 1 MARSHALL x 10 reps-with towel, DKTC X 10  Exercise 7: Supine, pelvic tilt, 10 reps  Exercise 8: Supine, partial crunch, 10 reps  Exercise 9: Sitting, LAQ, 3#, 10 reps  Exercise 10: Sitting, sit to stand, mat to chair progression, 10 reps-from mat  Exercise 11: Standing, multifidus row-not today, Paloff press-not today, shoulder retraction-red x 10 reps-NOT TODAY  Exercise 12: Standing, hip abd, ext, mini squat, heel raise 1 x 10  Exercise 13: Standing, press down on top of physio ball for abd contraction, 10 reps-NOT TODAY  Exercise 14: LBE, 5 min-NOT TODAY  Exercise 15: IFC wth MH as needed X 20 mins-seated                   /97, spo2 98%, HR 84  Exercise 16: ILT, 1/2 body wt, clinic protocols, as indicated.     Assessment:   Conditions Requiring

## 2025-05-29 ENCOUNTER — HOSPITAL ENCOUNTER (OUTPATIENT)
Dept: PHYSICAL THERAPY | Age: 35
Setting detail: THERAPIES SERIES
Discharge: HOME | End: 2025-05-29
Payer: COMMERCIAL

## 2025-05-29 PROCEDURE — 97110 THERAPEUTIC EXERCISES: CPT

## 2025-05-29 ASSESSMENT — PAIN SCALES - GENERAL: PAINLEVEL_OUTOF10: 5

## 2025-05-29 ASSESSMENT — PAIN DESCRIPTION - LOCATION: LOCATION: BACK;SHOULDER

## 2025-05-29 ASSESSMENT — PAIN DESCRIPTION - ORIENTATION: ORIENTATION: RIGHT

## 2025-05-29 ASSESSMENT — PAIN DESCRIPTION - DESCRIPTORS: DESCRIPTORS: ACHING;STABBING

## 2025-05-29 ASSESSMENT — PAIN DESCRIPTION - PAIN TYPE: TYPE: CHRONIC PAIN

## 2025-05-29 NOTE — PROGRESS NOTES
Physical Therapy: Daily Note/Reassessment   Patient: Naima Mdaison (35 y.o. female)   Examination Date: 2025  Plan of Care/Certification Expiration Date: 25    No data recorded   :  1990 # of Visits since SOC:   8   MRN: [id number]  CSN: [id number] Start of Care Date:   2025   Insurance: Payor: BC / Plan: BCBS OUT OF STATE / Product Type: *No Product type* /   Insurance ID: [id number] - ([facility]) Secondary Insurance (if applicable):    Referring Physician: Celina Mendez APRN Jessica Awbery   PCP: Cristy Kruse APRN - NP Visits to Date/Visits Approved:     No Show/Cancelled Appts:   /       Medical Diagnosis: Low back pain, unspecified [M54.50] Low back pain  Treatment Diagnosis: Low back pain        SUBJECTIVE EXAMINATION   Pain Level: Pain Screening  Patient Currently in Pain: Yes  Pain Assessment: 0-10  Pain Level: 5  Best Pain Level: 0  Worst Pain Level: 10  Pain Type: Chronic pain  Pain Location: Back, Shoulder  Pain Orientation: Right  Pain Descriptors: Aching, Stabbing    Patient Comments: Subjective: She rates back pain at 5/10.  She has intermittent pain.  She has most pain with prolonged standing. She is most comfortable lying down.  She feels her condition is improving with PT.    HEP Compliance: Good        OBJECTIVE EXAMINATION   Restrictions:  No data recorded No data recorded No data recorded          Balance/Gait Assessment(s) Performed:   5 Times Sit to Stand   Current Score: 24.95 seconds (Date: 2025)    Interpretation of Score: The 5 Times Sit to Stand Test (FTSST) measures functional lower limb muscle strength and may be useful in quantifying functional change of transitional movements. Greater than 16.0 seconds indicates increased risk of falls. Cut-off scores of 16 seconds discriminates fallers from non-fallers. < 60 years old: 11 seconds = normal; 70-80 years old: 13 seconds = normal; 80-90 years old: 15 seconds = normal.    TREATMENT     Exercises:

## 2025-06-06 ENCOUNTER — HOSPITAL ENCOUNTER (OUTPATIENT)
Dept: PHYSICAL THERAPY | Age: 35
Setting detail: THERAPIES SERIES
Discharge: HOME | End: 2025-06-06
Payer: COMMERCIAL

## 2025-06-06 PROCEDURE — G0283 ELEC STIM OTHER THAN WOUND: HCPCS

## 2025-06-06 PROCEDURE — 97110 THERAPEUTIC EXERCISES: CPT

## 2025-06-06 ASSESSMENT — PAIN DESCRIPTION - ORIENTATION: ORIENTATION: RIGHT

## 2025-06-06 ASSESSMENT — PAIN SCALES - GENERAL: PAINLEVEL_OUTOF10: 5

## 2025-06-06 ASSESSMENT — PAIN DESCRIPTION - LOCATION: LOCATION: BACK

## 2025-06-06 ASSESSMENT — PAIN DESCRIPTION - PAIN TYPE: TYPE: CHRONIC PAIN

## 2025-06-06 ASSESSMENT — PAIN DESCRIPTION - DESCRIPTORS: DESCRIPTORS: ACHING;STABBING

## 2025-06-06 NOTE — PROGRESS NOTES
Physical Therapy: Daily Note   Patient: Naima Madison (35 y.o. female)   Examination Date: 2025  Plan of Care/Certification Expiration Date: 25    No data recorded   :  1990 # of Visits since SOC:   9   MRN: [id number]  CSN: [id number] Start of Care Date:   2025   Insurance: Payor: BC / Plan: BCBS OUT OF STATE / Product Type: *No Product type* /   Insurance ID: [id number] - ([facility]) Secondary Insurance (if applicable):    Referring Physician: Celina Mendez APRN Jessica Awbery   PCP: Cristy Kruse APRN - NP Visits to Date/Visits Approved:     No Show/Cancelled Appts:   /       Medical Diagnosis: Low back pain, unspecified [M54.50]    Treatment Diagnosis: Low back pain        SUBJECTIVE EXAMINATION   Pain Level: Pain Screening  Patient Currently in Pain: Yes  Pain Assessment: 0-10  Pain Level: 5  Pain Type: Chronic pain  Pain Location: Back  Pain Orientation: Right  Pain Descriptors: Aching, Stabbing    Patient Comments: Subjective: Feeling alright.  My right side low back sitll bothers me.        TREATMENT     Exercises:      Treatment Reasoning    Exercise 1: 6 ' -no rest breaks     CHECK VITALS PRE AND POST SESSION ..  BP  133/78     98 spo2, 75 HR---------- HEP issued 2025  Exercise 2: Supine, TA series alone 5  x 10 sec, all others 10 reps- minimal cues  Exercise 3: Supine, lower trunk rotation, 10 reps  Exercise 4: Supine, bridges, 10 reps  Exercise 5: Supine, check leg length, treat accordingly,-right leg longer today synergy tech x 1 to correct  Exercise 6: Supine, 1 MARSHALL x 10 reps-with towel, DKTC X 10  Exercise 7: Supine, pelvic tilt, 10 reps  Exercise 8: Supine, partial crunch, 10 reps  Exercise 9: Sitting, LAQ, 4#, 10 reps  Exercise 10: Sitting, sit to stand, mat to chair progression, 10 reps-from mat  Exercise 11: Standing, multifidus row-not today, Paloff press-not today, shoulder retraction-red x 10 reps-  Exercise 12: Standing, hip abd, ext, mini squat, heel

## 2025-06-12 ENCOUNTER — HOSPITAL ENCOUNTER (OUTPATIENT)
Dept: PHYSICAL THERAPY | Age: 35
Setting detail: THERAPIES SERIES
Discharge: HOME | End: 2025-06-12
Payer: COMMERCIAL

## 2025-06-12 PROCEDURE — G0283 ELEC STIM OTHER THAN WOUND: HCPCS

## 2025-06-12 PROCEDURE — 97110 THERAPEUTIC EXERCISES: CPT

## 2025-06-12 ASSESSMENT — PAIN DESCRIPTION - LOCATION: LOCATION: BACK

## 2025-06-12 ASSESSMENT — PAIN SCALES - GENERAL: PAINLEVEL_OUTOF10: 4

## 2025-06-12 ASSESSMENT — PAIN DESCRIPTION - PAIN TYPE: TYPE: CHRONIC PAIN

## 2025-06-12 NOTE — PROGRESS NOTES
Daily Treatment Note  Date: 2025  Patient Name: Naima Madison  MRN: [id number]     :   1990    Referring Physician: Celina Mendez APRN Jessica Awbery   PCP: Cristy Kruse APRN - NP    Medical Diagnosis: Low back pain, unspecified [M54.50] Low back pain  Treatment Diagnosis: Low back pain      Insurance: Payor: BCBS / Plan: BCBS OUT OF STATE / Product Type: *No Product type* /   Insurance ID: [id number] - ([place] [employer])    Subjective:  General  Diagnosis: Low back pain  Referring Provider (secondary): Celina Mendez  PT Insurance Information: BCBS    (no auth)  Total # of Visits Approved: 12  Total # of Visits to Date: 10  Plan of Care/Certification Expiration Date: 25  Progress Note Due Date: 25  Referring Provider (secondary): Celina Mendez  Subjective: not bad, about a 4. therapy has been helping  Patient Currently in Pain: Yes  Pain Level: 4  Pain Type: Chronic pain  Pain Location: Back       Treatment Activities:  Exercises:      Treatment Reasoning    Exercise 1: 6 ' -no rest breaks     CHECK VITALS PRE AND POST SESSION ..  BP  139/87     98 spo2, 66 HR---------  Exercise 2: Supine, TA series alone 5  x 10 sec, all others 10 reps- minimal cues  Exercise 3: Supine, lower trunk rotation, 10 reps  Exercise 4: Supine, bridges, 10 reps  Exercise 5: Supine, check leg length, treat accordingly,--: EVEN  Exercise 6: Supine, 1 MARSHALL x 10 reps-with towel, DKTC X 10  Exercise 7: Supine, pelvic tilt, 10 reps  Exercise 8: Supine, partial crunch, 10 reps  Exercise 9: Sitting, LAQ, 4#, 10 reps  Exercise 10: Sitting, sit to stand, mat to chair progression, 10 reps-from mat  Exercise 11: Standing, multifidus row-not today, Paloff press-not today, shoulder retraction-red x 10 reps-  Exercise 12: Standing, hip abd, ext, mini squat, heel raise 1 x 10  Exercise 13: Standing, press down on top of physio ball for abd contraction, 10 reps-NOT TODAY  Exercise 14: LBE, 5 min--not today  Exercise 15: IFC

## 2025-06-13 ENCOUNTER — HOSPITAL ENCOUNTER (OUTPATIENT)
Dept: PHYSICAL THERAPY | Age: 35
Setting detail: THERAPIES SERIES
End: 2025-06-13
Payer: COMMERCIAL

## 2025-06-20 ENCOUNTER — APPOINTMENT (OUTPATIENT)
Dept: PHYSICAL THERAPY | Age: 35
End: 2025-06-20
Payer: COMMERCIAL

## 2025-06-27 ENCOUNTER — HOSPITAL ENCOUNTER (OUTPATIENT)
Dept: PHYSICAL THERAPY | Age: 35
Setting detail: THERAPIES SERIES
Discharge: HOME | End: 2025-06-27
Payer: COMMERCIAL

## 2025-06-27 PROCEDURE — G0283 ELEC STIM OTHER THAN WOUND: HCPCS

## 2025-06-27 PROCEDURE — 97110 THERAPEUTIC EXERCISES: CPT

## 2025-06-27 ASSESSMENT — PAIN DESCRIPTION - LOCATION: LOCATION: BACK

## 2025-06-27 ASSESSMENT — PAIN SCALES - GENERAL: PAINLEVEL_OUTOF10: 0

## 2025-06-27 NOTE — PROGRESS NOTES
Physical Therapy: Daily Note/Reassessment   Patient: Naima Madison (35 y.o. female)   Examination Date: 2025  Plan of Care/Certification Expiration Date: 25    No data recorded   :  1990 # of Visits since SOC:   11   MRN: [id number]  CSN: [id number] Start of Care Date:   2025   Insurance: Payor: BCBS / Plan: BCBS OUT OF STATE / Product Type: *No Product type* /   Insurance ID: [id number] - ([facility]) Secondary Insurance (if applicable):    Referring Physician: Celina Mendez APRN Jessica Awbery   PCP: Cristy Kruse APRN - NP Visits to Date/Visits Approved:     No Show/Cancelled Appts:   /       Medical Diagnosis: Low back pain, unspecified [M54.50] Low back pain  Treatment Diagnosis: Low back pain        SUBJECTIVE EXAMINATION   Pain Level: Pain Screening  Patient Currently in Pain: Yes  Pain Assessment: 0-10  Pain Level: 0  Pain Location: Back    Patient Comments: Subjective: Patient concedes that she is improving with participation with PT. Walking is improved, transitional movements are appearing improved. Patient has been issued her HEP and states she has been performing them as instructed. She is still having some difficulty with sleeping, but that was improving.    HEP Compliance: Good        OBJECTIVE EXAMINATION   Restrictions:  No data recorded No data recorded No data recorded        TREATMENT     Exercises:      Treatment Reasoning    Exercise 1: 6 ' -no rest breaks     CHECK VITALS PRE AND POST SESSION ..  BP  139/87     98 spo2, 66 HR--------- walk not performed  (reassessment)  Exercise 2: Supine, TA series alone 5  x 10 sec, all others 10 reps- minimal cues  Exercise 3: Supine, lower trunk rotation, 10 reps  Exercise 4: Supine, bridges, 15 reps  Exercise 5: Supine, check leg length, treat accordingly,--: EVEN  Exercise 6: Supine, 1 MARSHALL x 10 reps-with towel, DKTC X 10  Exercise 7: Supine, pelvic tilt, 10 reps  Exercise 8: Supine, partial crunch, 10